# Patient Record
Sex: FEMALE | Race: WHITE | NOT HISPANIC OR LATINO | Employment: OTHER | URBAN - METROPOLITAN AREA
[De-identification: names, ages, dates, MRNs, and addresses within clinical notes are randomized per-mention and may not be internally consistent; named-entity substitution may affect disease eponyms.]

---

## 2021-10-22 ENCOUNTER — OFFICE VISIT (OUTPATIENT)
Dept: URGENT CARE | Facility: CLINIC | Age: 36
End: 2021-10-22
Payer: COMMERCIAL

## 2021-10-22 VITALS
DIASTOLIC BLOOD PRESSURE: 85 MMHG | TEMPERATURE: 98 F | RESPIRATION RATE: 16 BRPM | WEIGHT: 285 LBS | BODY MASS INDEX: 45.8 KG/M2 | SYSTOLIC BLOOD PRESSURE: 141 MMHG | OXYGEN SATURATION: 96 % | HEIGHT: 66 IN | HEART RATE: 76 BPM

## 2021-10-22 DIAGNOSIS — K08.89 PAIN, DENTAL: Primary | ICD-10-CM

## 2021-10-22 PROCEDURE — 99203 OFFICE O/P NEW LOW 30 MIN: CPT | Performed by: EMERGENCY MEDICINE

## 2021-10-22 RX ORDER — CLINDAMYCIN HYDROCHLORIDE 150 MG/1
150 CAPSULE ORAL EVERY 8 HOURS SCHEDULED
Qty: 21 CAPSULE | Refills: 0 | Status: SHIPPED | OUTPATIENT
Start: 2021-10-22 | End: 2021-10-29

## 2022-04-01 ENCOUNTER — OFFICE VISIT (OUTPATIENT)
Dept: URGENT CARE | Facility: CLINIC | Age: 37
End: 2022-04-01
Payer: MEDICARE

## 2022-04-01 VITALS
HEIGHT: 66 IN | RESPIRATION RATE: 18 BRPM | WEIGHT: 280 LBS | BODY MASS INDEX: 45 KG/M2 | HEART RATE: 119 BPM | TEMPERATURE: 98 F | OXYGEN SATURATION: 100 % | SYSTOLIC BLOOD PRESSURE: 137 MMHG | DIASTOLIC BLOOD PRESSURE: 98 MMHG

## 2022-04-01 DIAGNOSIS — Z20.2 STD EXPOSURE: Primary | ICD-10-CM

## 2022-04-01 PROCEDURE — 99213 OFFICE O/P EST LOW 20 MIN: CPT | Performed by: EMERGENCY MEDICINE

## 2022-04-01 PROCEDURE — 87491 CHLMYD TRACH DNA AMP PROBE: CPT | Performed by: EMERGENCY MEDICINE

## 2022-04-01 PROCEDURE — 87591 N.GONORRHOEAE DNA AMP PROB: CPT | Performed by: EMERGENCY MEDICINE

## 2022-04-01 NOTE — PROGRESS NOTES
[facility]        NAME: Lizbeth Mireles is a 29 y o  female  : 1985    MRN: [id number]  DATE: 2022  TIME: 7:04 PM    Assessment and Plan   STD exposure [Z20 2]  1  STD exposure  Chlamydia/GC amplified DNA by PCR    CANCELED: Chlamydia/GC amplified DNA by PCR    CANCELED: Chlamydia/GC amplified DNA by PCR         Patient Instructions     Patient Instructions   Sexually Transmitted Diseases   WHAT YOU NEED TO KNOW:   A sexually transmitted disease (STD) means signs or symptoms of a sexually transmitted infection (STI) have developed  An STI happens when bacteria or a virus are spread through oral, genital, or anal sex  Some examples of STDs are HIV, chlamydia, syphilis, and gonorrhea  DISCHARGE INSTRUCTIONS:   Return to the emergency department if:   · You have genital swelling or pain, or unusual bleeding  · You have joint pain, a rash, swollen lymph nodes, or night sweats  · You have severe abdominal pain  Call your doctor if:   · You have a fever  · Your symptoms do not go away or get worse, even after treatment  · You have bleeding or pain during sex  · You or your female partner may be pregnant  · You have questions or concerns about your condition or care  Medicines: You may need any of the following:  · Antibiotics  may be given for a bacterial infection  · Antivirals  may be given for a viral infection  · Antifungals  may be given for a fungal infection, such as a yeast infection  · Take your medicine as directed  Contact your healthcare provider if you think your medicine is not helping or if you have side effects  Tell him of her if you are allergic to any medicine  Keep a list of the medicines, vitamins, and herbs you take  Include the amounts, and when and why you take them  Bring the list or the pill bottles to follow-up visits  Carry your medicine list with you in case of an emergency      Help prevent the spread of an STI:  Ask your healthcare provider for more information about the following safe sex practices:  · Use a male or female condom during sex  This includes oral, genital, or anal sex  Use a new condom each time  Condoms help prevent pregnancy and STIs  Use latex condoms, if possible  Lambskin (also called sheepskin or natural membrane) condoms do not protect against STIs  A polyurethane condom can be used if you or your partner is allergic to latex  Condoms should be used with a second form of birth control to help prevent pregnancy and STIs  Do not use male and female condoms together  · Do not have sex with someone who has an STI or STD  This includes oral and anal sex  · Limit sex partners  Ask about your partner's sex history before you have sex  · Get screened regularly if you are sexually active  Common tests include chlamydia, gonorrhea, HIV, hepatitis, and syphilis  · Tell your sex partners if you have an STI  Your partners may need to be tested and treated  Do not have sex while you are being treated for an STI  Do not have sex with a partner who is being treated  · Ask about medicines to lower your risk for some STIs:      ? Vaccines  can help protect you from hepatitis A, hepatitis B, and the human papillomavirus (HPV)  The HPV vaccine is usually given at 11 years, but it may be given through 26 years to both females and males  Your provider can give you more information on vaccines to prevent STIs  ? Pre-exposure prophylaxis (PrEP)  may be given if you are at high risk for HIV  PrEP is taken every day to prevent the virus from fully infecting the body  · If you are a woman, do not douche  Douching upsets the normal balance of bacteria found in your vagina  It does not prevent or clear up vaginal infections  Follow up with your doctor as directed: You may need more tests  If you have an STD, you may need immediate or ongoing treatment   Your doctor may also refer you to a specialist who can provide specific treatment  Write down your questions so you remember to ask them during your visits  © Copyright [employer] 2022 Information is for End User's use only and may not be sold, redistributed or otherwise used for commercial purposes  All illustrations and images included in CareNotes® are the copyrighted property of A D A M , Inc  or Joshua Grissom  The above information is an  only  It is not intended as medical advice for individual conditions or treatments  Talk to your doctor, nurse or pharmacist before following any medical regimen to see if it is safe and effective for you  Follow up with PCP in 3-5 days  Proceed to  ER if symptoms worsen  Chief Complaint     Chief Complaint   Patient presents with    Exposure to STD     exposed to chylamdia          History of Present Illness       Patient concerned about possibility of chlamydia after friend of a friend tested positive  Patient denies any symptoms  Review of Systems   Review of Systems   Constitutional: Negative for chills and fever  Respiratory: Negative for shortness of breath  Cardiovascular: Negative for chest pain  Gastrointestinal: Negative for abdominal distention, abdominal pain, blood in stool, nausea and vomiting  Genitourinary: Negative for menstrual problem, pelvic pain, vaginal bleeding and vaginal discharge  Skin: Negative for pallor and rash  Current Medications     No current outpatient medications on file      Current Allergies     Allergies as of 04/01/2022 - Reviewed 04/01/2022   Allergen Reaction Noted    Penicillins Hives 05/29/2018    Cephalosporins Hives 04/01/2022            The following portions of the patient's history were reviewed and updated as appropriate: allergies, current medications, past family history, past medical history, past social history, past surgical history and problem list      Past Medical History:   Diagnosis Date    Known health problems: none        Past Surgical History:   Procedure Laterality Date    ADENOIDECTOMY      TONSILLECTOMY      TYMPANOSTOMY TUBE PLACEMENT         History reviewed  No pertinent family history  Medications have been verified  Objective   /98   Pulse (!) 119   Temp 98 °F (36 7 °C)   Resp 18   Ht 5' 6" (1 676 m)   Wt 127 kg (280 lb)   SpO2 100%   BMI 45 19 kg/m²        Physical Exam     Physical Exam  Vitals and nursing note reviewed  Constitutional:       General: She is not in acute distress  Appearance: Normal appearance  She is obese  She is not ill-appearing  Skin:     General: Skin is warm and dry  Findings: No erythema  Neurological:      Mental Status: She is alert  Psychiatric:         Mood and Affect: Mood normal          Behavior: Behavior normal          Thought Content:  Thought content normal          Judgment: Judgment normal

## 2022-04-01 NOTE — PATIENT INSTRUCTIONS
Sexually Transmitted Diseases   WHAT YOU NEED TO KNOW:   A sexually transmitted disease (STD) means signs or symptoms of a sexually transmitted infection (STI) have developed  An STI happens when bacteria or a virus are spread through oral, genital, or anal sex  Some examples of STDs are HIV, chlamydia, syphilis, and gonorrhea  DISCHARGE INSTRUCTIONS:   Return to the emergency department if:   · You have genital swelling or pain, or unusual bleeding  · You have joint pain, a rash, swollen lymph nodes, or night sweats  · You have severe abdominal pain  Call your doctor if:   · You have a fever  · Your symptoms do not go away or get worse, even after treatment  · You have bleeding or pain during sex  · You or your female partner may be pregnant  · You have questions or concerns about your condition or care  Medicines: You may need any of the following:  · Antibiotics  may be given for a bacterial infection  · Antivirals  may be given for a viral infection  · Antifungals  may be given for a fungal infection, such as a yeast infection  · Take your medicine as directed  Contact your healthcare provider if you think your medicine is not helping or if you have side effects  Tell him of her if you are allergic to any medicine  Keep a list of the medicines, vitamins, and herbs you take  Include the amounts, and when and why you take them  Bring the list or the pill bottles to follow-up visits  Carry your medicine list with you in case of an emergency  Help prevent the spread of an STI:  Ask your healthcare provider for more information about the following safe sex practices:  · Use a male or female condom during sex  This includes oral, genital, or anal sex  Use a new condom each time  Condoms help prevent pregnancy and STIs  Use latex condoms, if possible  Lambskin (also called sheepskin or natural membrane) condoms do not protect against STIs   A polyurethane condom can be used if you or your partner is allergic to latex  Condoms should be used with a second form of birth control to help prevent pregnancy and STIs  Do not use male and female condoms together  · Do not have sex with someone who has an STI or STD  This includes oral and anal sex  · Limit sex partners  Ask about your partner's sex history before you have sex  · Get screened regularly if you are sexually active  Common tests include chlamydia, gonorrhea, HIV, hepatitis, and syphilis  · Tell your sex partners if you have an STI  Your partners may need to be tested and treated  Do not have sex while you are being treated for an STI  Do not have sex with a partner who is being treated  · Ask about medicines to lower your risk for some STIs:      ? Vaccines  can help protect you from hepatitis A, hepatitis B, and the human papillomavirus (HPV)  The HPV vaccine is usually given at 11 years, but it may be given through 26 years to both females and males  Your provider can give you more information on vaccines to prevent STIs  ? Pre-exposure prophylaxis (PrEP)  may be given if you are at high risk for HIV  PrEP is taken every day to prevent the virus from fully infecting the body  · If you are a woman, do not douche  Douching upsets the normal balance of bacteria found in your vagina  It does not prevent or clear up vaginal infections  Follow up with your doctor as directed: You may need more tests  If you have an STD, you may need immediate or ongoing treatment  Your doctor may also refer you to a specialist who can provide specific treatment  Write down your questions so you remember to ask them during your visits  © Copyright [employer] 2022 Information is for End User's use only and may not be sold, redistributed or otherwise used for commercial purposes   All illustrations and images included in CareNotes® are the copyrighted property of [employer] Inc  or [facility] Manjit Nieto   The above information is an  only  It is not intended as medical advice for individual conditions or treatments  Talk to your doctor, nurse or pharmacist before following any medical regimen to see if it is safe and effective for you

## 2022-04-02 LAB
C TRACH DNA SPEC QL NAA+PROBE: NEGATIVE
N GONORRHOEA DNA SPEC QL NAA+PROBE: NEGATIVE

## 2022-06-02 ENCOUNTER — OFFICE VISIT (OUTPATIENT)
Dept: URGENT CARE | Facility: CLINIC | Age: 37
End: 2022-06-02
Payer: MEDICARE

## 2022-06-02 VITALS
WEIGHT: 280 LBS | RESPIRATION RATE: 16 BRPM | OXYGEN SATURATION: 98 % | TEMPERATURE: 97 F | HEIGHT: 66 IN | SYSTOLIC BLOOD PRESSURE: 127 MMHG | HEART RATE: 100 BPM | DIASTOLIC BLOOD PRESSURE: 56 MMHG | BODY MASS INDEX: 45 KG/M2

## 2022-06-02 DIAGNOSIS — L23.7 ALLERGIC DERMATITIS DUE TO POISON IVY: Primary | ICD-10-CM

## 2022-06-02 DIAGNOSIS — R55 VASOVAGAL SYNCOPE: ICD-10-CM

## 2022-06-02 PROCEDURE — 96372 THER/PROPH/DIAG INJ SC/IM: CPT | Performed by: PHYSICIAN ASSISTANT

## 2022-06-02 PROCEDURE — 99213 OFFICE O/P EST LOW 20 MIN: CPT | Performed by: PHYSICIAN ASSISTANT

## 2022-06-02 RX ORDER — METHYLPREDNISOLONE SODIUM SUCCINATE 125 MG/2ML
125 INJECTION, POWDER, LYOPHILIZED, FOR SOLUTION INTRAMUSCULAR; INTRAVENOUS ONCE
Status: DISCONTINUED | OUTPATIENT
Start: 2022-06-02 | End: 2022-06-02

## 2022-06-02 RX ORDER — METHYLPREDNISOLONE SODIUM SUCCINATE 125 MG/2ML
125 INJECTION, POWDER, LYOPHILIZED, FOR SOLUTION INTRAMUSCULAR; INTRAVENOUS ONCE
Status: COMPLETED | OUTPATIENT
Start: 2022-06-02 | End: 2022-06-02

## 2022-06-02 RX ADMIN — METHYLPREDNISOLONE SODIUM SUCCINATE 125 MG: 125 INJECTION, POWDER, LYOPHILIZED, FOR SOLUTION INTRAMUSCULAR; INTRAVENOUS at 20:05

## 2022-06-02 NOTE — PROGRESS NOTES
Approx  3 minutes after giving IM injection I heard a loud bang in room  I ran in and found pt laying on left side on the ground  Used an ammonia inhalant and pt woke up  Took pt approx 30 seconds to respond  Alert and oriented  Small contusion in left frontal area  PEARLA  Vitals stable  Very diaphoretic  Given OJ and crackers  Small abrasion on right lateral ankle

## 2022-06-02 NOTE — PROGRESS NOTES
[facility]        NAME: Hanna Homans is a 29 y o  female  : 1985    MRN: [id number]  DATE: 2022  TIME: 8:09 PM    Assessment and Plan   Allergic dermatitis due to poison ivy [L23 7]  1  Allergic dermatitis due to poison ivy  methylPREDNISolone sodium succinate (Solu-MEDROL) injection 125 mg    DISCONTINUED: methylPREDNISolone sodium succinate (Solu-MEDROL) injection 125 mg   2  Vasovagal syncope       Patient had vasovagal syncope after steroid injection  Patient fell off the table and struck her left temple on computer in sustained small superficial abrasion to right ankle  Patient with LOC for approximately for 20 seconds  Ammonia used  Patient became alert and oriented  Vitals normal  Neuro exam normal   Small contusion to forehead  No neck pain  Discussed with patient signs and symptoms of concussion and to seek evaluation emergency room if they occur  Given OJ and crackers  Patient Instructions    May use over-the-counter Benadryl in addition to prednisone if needed  Avoid scratching  Cool showers  Ice packs  Observe for signs of infection including redness, cloudy drainage, swelling, streaking up the extremity, fevers and chills, or persistent symptoms  Follow-up with PCP in 3-5 days  Go to ER if symptoms become severe  Follow up with PCP in 3-5 days  Proceed to  ER if symptoms worsen  Chief Complaint     Chief Complaint   Patient presents with   [facility]     Poison ivy on buttock x1 week- gradually getting worse         History of Present Illness       Patient is a 71-year-old female with significant past medical history of suppurative hidradenitis presents the office complaining of poison ivy to her buttocks and posterior thighs for 1 week  Reports the rash is intensely pruritic  States she was hanging out in the woods and her friends who were with her also have poison ivy    She has been using over-the-counter poison ivy lotion and oatmeal bath with no improvement of symptoms  Denies fevers, chills, or purulent drainage  Review of Systems   Review of Systems   Constitutional: Negative for chills and fever  HENT: Negative for trouble swallowing  Respiratory: Negative for cough and chest tightness  Skin: Positive for rash  Current Medications     No current outpatient medications on file  No current facility-administered medications for this visit  Current Allergies     Allergies as of 06/02/2022 - Reviewed 06/02/2022   Allergen Reaction Noted    Penicillins Hives 05/29/2018    Cephalosporins Hives 04/01/2022            The following portions of the patient's history were reviewed and updated as appropriate: allergies, current medications, past family history, past medical history, past social history, past surgical history and problem list      Past Medical History:   Diagnosis Date    Known health problems: none        Past Surgical History:   Procedure Laterality Date    ADENOIDECTOMY      TONSILLECTOMY      TYMPANOSTOMY TUBE PLACEMENT         Family History   Problem Relation Age of Onset    No Known Problems Mother     Pulmonary fibrosis Father          Medications have been verified  Objective   /56   Pulse 100   Temp (!) 97 °F (36 1 °C)   Resp 16   Ht 5' 6" (1 676 m)   Wt 127 kg (280 lb)   LMP 06/01/2022   SpO2 98%   BMI 45 19 kg/m²   Patient's last menstrual period was 06/01/2022  Physical Exam     Physical Exam  Vitals and nursing note reviewed  Constitutional:       Appearance: She is well-developed  HENT:      Head: Normocephalic and atraumatic  Right Ear: External ear normal       Left Ear: External ear normal       Nose: Nose normal    Eyes:      General: Lids are normal       Conjunctiva/sclera: Conjunctivae normal    Cardiovascular:      Rate and Rhythm: Normal rate and regular rhythm     Pulmonary:      Effort: Pulmonary effort is normal       Breath sounds: Normal breath sounds  Skin:     General: Skin is warm and dry  Capillary Refill: Capillary refill takes less than 2 seconds  Findings: Rash (Macular erythematous rash to buttocks and posterior thighs ) and wound (small abraion to lateral right ankle  Cleaned and placed bandaid ) present  Neurological:      General: No focal deficit present  Mental Status: She is alert  GCS: GCS eye subscore is 4  GCS verbal subscore is 5  GCS motor subscore is 6  Cranial Nerves: Cranial nerves are intact  Sensory: Sensation is intact  Motor: Motor function is intact  Coordination: Coordination is intact  Gait: Gait is intact

## 2022-06-02 NOTE — PATIENT INSTRUCTIONS
May use over-the-counter Benadryl in addition to prednisone if needed  Avoid scratching  Cool showers  Ice packs  Observe for signs of infection including redness, cloudy drainage, swelling, streaking up the extremity, fevers and chills, or persistent symptoms  Follow-up with PCP in 3-5 days  Go to ER if symptoms become severe

## 2022-06-03 ENCOUNTER — TELEPHONE (OUTPATIENT)
Dept: URGENT CARE | Facility: CLINIC | Age: 37
End: 2022-06-03

## 2022-06-03 NOTE — TELEPHONE ENCOUNTER
Patient called to ask about Prescription not being at Pharmacy for pickup today 6/3  Patient informed that due to the reaction patient had yesterday (on 6/2) to the IM injection given  Our provider did not want to continue with the oral medication due to the possible side effects patient could have  Patient informed to continue with taking the OTC medication Benadryl, to take cool showers, use ice packs, and to avoid itching  Patient also informed that if redness, cloudy drainage, or streaking is to occur to head to the nearest ER for evaluation  Patient understanding to all education given    Timothy Guillen RN

## 2022-11-02 ENCOUNTER — OFFICE VISIT (OUTPATIENT)
Dept: URGENT CARE | Facility: CLINIC | Age: 37
End: 2022-11-02

## 2022-11-02 VITALS
WEIGHT: 285 LBS | BODY MASS INDEX: 45.8 KG/M2 | HEIGHT: 66 IN | RESPIRATION RATE: 18 BRPM | DIASTOLIC BLOOD PRESSURE: 76 MMHG | TEMPERATURE: 97.1 F | OXYGEN SATURATION: 95 % | HEART RATE: 79 BPM | SYSTOLIC BLOOD PRESSURE: 104 MMHG

## 2022-11-02 DIAGNOSIS — J06.9 ACUTE URI: ICD-10-CM

## 2022-11-02 DIAGNOSIS — H66.001 NON-RECURRENT ACUTE SUPPURATIVE OTITIS MEDIA OF RIGHT EAR WITHOUT SPONTANEOUS RUPTURE OF TYMPANIC MEMBRANE: Primary | ICD-10-CM

## 2022-11-02 RX ORDER — AZITHROMYCIN 500 MG/1
TABLET, FILM COATED ORAL
Qty: 6 TABLET | Refills: 0 | Status: SHIPPED | OUTPATIENT
Start: 2022-11-02 | End: 2022-11-06

## 2022-11-02 RX ORDER — IBUPROFEN 800 MG/1
800 TABLET ORAL DAILY PRN
COMMUNITY

## 2022-11-02 NOTE — ASSESSMENT & PLAN NOTE
Discussed problem patient  Advised over-the-counter treatments for URI symptoms such as DayQuil and NyQuil  Advised to try warm salt water gargle for sore throat and decongestants such as Mucinex for congestion and postnasal drip

## 2022-11-02 NOTE — PATIENT INSTRUCTIONS
Discussed chronic patient  Prescribing azithromycin for right ear infection  You may want to take antibiotic with some food as it causes some GI upset  Discussed over-the-counter treatments for cold symptoms such as DayQuil and NyQuil for and ibuprofen as needed for any pain  Recommended a humidifier in the bedroom at night to help with congestion as well as a warm salt water gargle for sore throat  Advised to follow-up with PCP in 5-7 days for recheck  Answered all questions

## 2022-11-02 NOTE — ASSESSMENT & PLAN NOTE
Discussed problem with patient  Placing patient on azithromycin for right-sided acute otitis media due to penicillin and cephalosporin allergy  Advised patient to follow-up with PCP in 5 days for recheck

## 2022-11-02 NOTE — PROGRESS NOTES
[facility]        NAME: Akira Calzada is a 40 y o  female  : 1985    MRN: [id number]  DATE: 2022  TIME: 11:59 AM    Assessment and Plan   Non-recurrent acute suppurative otitis media of right ear without spontaneous rupture of tympanic membrane [H66 001]  1  Non-recurrent acute suppurative otitis media of right ear without spontaneous rupture of tympanic membrane  azithromycin (ZITHROMAX) 500 MG tablet   2  Acute URI        Discussed problem with patient  Placing patient on azithromycin for right-sided acute otitis media due to penicillin and cephalosporin allergy  Advised over-the-counter ibuprofen for pain symptoms  Advised patient to follow-up with PCP in 5 days for recheck  Discussed problem patient  Advised over-the-counter treatments for URI symptoms such as DayQuil and NyQuil  Advised to try warm salt water gargle for sore throat and decongestants such as Mucinex for congestion and postnasal drip  Patient Instructions       Follow up with PCP in 3-5 days  Proceed to  ER if symptoms worsen  Chief Complaint     Chief Complaint   Patient presents with   • Cold Like Symptoms     C/o cough, sore throat, bilateral ear pain, onset 3 days ago  Refuses Covid testing  History of Present Illness       A 28-year-old female complains of nasal congestion, postnasal drip, and bilateral ear pain for the last 2 days  Her daughter was recently sick with upper respiratory symptoms last week and has since improved  Has been eating and drinking normally  Has not tried any over-the-counter treatments for relief  Denies fevers, chills, abdominal complaints, and myalgias      Review of Systems   Review of Systems   Constitutional: Negative for appetite change, chills and fever  HENT: Positive for congestion, ear pain, postnasal drip and sore throat  Negative for ear discharge, sinus pressure and sinus pain  Respiratory: Positive for cough   Negative for shortness of breath, wheezing and stridor  Cardiovascular: Negative for chest pain and palpitations  Gastrointestinal: Negative for abdominal pain, constipation, diarrhea, nausea and vomiting  Musculoskeletal: Negative for myalgias  Skin: Negative for pallor and rash  Neurological: Negative for light-headedness and headaches  Current Medications       Current Outpatient Medications:   •  azithromycin (ZITHROMAX) 500 MG tablet, Take 2 tablets today then 1 tablet daily x 4 days, Disp: 6 tablet, Rfl: 0  •  ibuprofen (MOTRIN) 800 mg tablet, Take 800 mg by mouth daily as needed for mild pain, Disp: , Rfl:     Current Allergies     Allergies as of 11/02/2022 - Reviewed 11/02/2022   Allergen Reaction Noted   • Penicillins Hives 05/29/2018   • Cephalosporins Hives 04/01/2022   • Dorenda Odalis [loracarbef] Hives 11/02/2022            The following portions of the patient's history were reviewed and updated as appropriate: allergies, current medications, past family history, past medical history, past social history, past surgical history and problem list      Past Medical History:   Diagnosis Date   • Known health problems: none        Past Surgical History:   Procedure Laterality Date   • ADENOIDECTOMY     • TONSILLECTOMY     • TYMPANOSTOMY TUBE PLACEMENT         Family History   Problem Relation Age of Onset   • No Known Problems Mother    • Pulmonary fibrosis Father          Medications have been verified  Objective   /76   Pulse 79   Temp (!) 97 1 °F (36 2 °C)   Resp 18   Ht 5' 6" (1 676 m)   Wt 129 kg (285 lb)   LMP 10/07/2022   SpO2 95%   BMI 46 00 kg/m²        Physical Exam     Physical Exam  Vitals reviewed  Constitutional:       General: She is not in acute distress  Appearance: Normal appearance  She is normal weight  She is not ill-appearing  HENT:      Head: Normocephalic  Right Ear: Hearing, ear canal and external ear normal  Tympanic membrane is erythematous and bulging  Left Ear: Hearing, tympanic membrane, ear canal and external ear normal       Nose: Congestion and rhinorrhea present  Mouth/Throat:      Mouth: Mucous membranes are moist       Pharynx: Oropharynx is clear  No oropharyngeal exudate or posterior oropharyngeal erythema  Eyes:      General:         Right eye: No discharge  Left eye: No discharge  Extraocular Movements: Extraocular movements intact  Conjunctiva/sclera: Conjunctivae normal       Pupils: Pupils are equal, round, and reactive to light  Neck:      Vascular: No carotid bruit  Cardiovascular:      Rate and Rhythm: Normal rate and regular rhythm  Pulses: Normal pulses  Heart sounds: Normal heart sounds  No murmur heard  No friction rub  No gallop  Pulmonary:      Effort: Pulmonary effort is normal  No respiratory distress  Breath sounds: Normal breath sounds  No stridor  No wheezing, rhonchi or rales  Chest:      Chest wall: No tenderness  Abdominal:      General: Abdomen is flat  Bowel sounds are normal  There is no distension  Palpations: Abdomen is soft  There is no mass  Tenderness: There is no abdominal tenderness  There is no guarding or rebound  Hernia: No hernia is present  Musculoskeletal:      Cervical back: Normal range of motion and neck supple  No rigidity or tenderness  Lymphadenopathy:      Cervical: No cervical adenopathy  Skin:     General: Skin is warm and dry  Capillary Refill: Capillary refill takes less than 2 seconds  Coloration: Skin is not pale  Findings: No bruising or erythema  Neurological:      General: No focal deficit present  Mental Status: She is alert and oriented to person, place, and time  Cranial Nerves: No cranial nerve deficit  Sensory: No sensory deficit  Motor: No weakness        Coordination: Coordination normal    Psychiatric:         Mood and Affect: Mood normal          Behavior: Behavior normal

## 2023-01-01 PROBLEM — H66.001 NON-RECURRENT ACUTE SUPPURATIVE OTITIS MEDIA OF RIGHT EAR WITHOUT SPONTANEOUS RUPTURE OF TYMPANIC MEMBRANE: Status: RESOLVED | Noted: 2022-11-02 | Resolved: 2023-01-01

## 2023-01-01 PROBLEM — J06.9 ACUTE URI: Status: RESOLVED | Noted: 2022-11-02 | Resolved: 2023-01-01

## 2023-03-01 ENCOUNTER — HOSPITAL ENCOUNTER (EMERGENCY)
Facility: HOSPITAL | Age: 38
Discharge: HOME | End: 2023-03-01
Attending: EMERGENCY MEDICINE

## 2023-03-01 ENCOUNTER — APPOINTMENT (EMERGENCY)
Dept: ULTRASOUND IMAGING | Facility: HOSPITAL | Age: 38
End: 2023-03-01

## 2023-03-01 VITALS
DIASTOLIC BLOOD PRESSURE: 90 MMHG | OXYGEN SATURATION: 99 % | SYSTOLIC BLOOD PRESSURE: 140 MMHG | BODY MASS INDEX: 45.55 KG/M2 | TEMPERATURE: 97.7 F | RESPIRATION RATE: 16 BRPM | HEART RATE: 98 BPM | WEIGHT: 282.19 LBS

## 2023-03-01 DIAGNOSIS — N83.209 HEMORRHAGIC OVARIAN CYST: ICD-10-CM

## 2023-03-01 DIAGNOSIS — N93.8 DYSFUNCTIONAL UTERINE BLEEDING: Primary | ICD-10-CM

## 2023-03-01 LAB
ABO GROUP BLD: NORMAL
ALBUMIN SERPL BCP-MCNC: 4.2 G/DL (ref 3.5–5)
ALP SERPL-CCNC: 73 U/L (ref 34–104)
ALT SERPL W P-5'-P-CCNC: 10 U/L (ref 7–52)
ANION GAP SERPL CALCULATED.3IONS-SCNC: 6 MMOL/L (ref 4–13)
AST SERPL W P-5'-P-CCNC: 15 U/L (ref 13–39)
BASOPHILS # BLD AUTO: 0.07 THOUSANDS/ÂΜL (ref 0–0.1)
BASOPHILS NFR BLD AUTO: 1 % (ref 0–1)
BILIRUB SERPL-MCNC: 0.28 MG/DL (ref 0.2–1)
BLD GP AB SCN SERPL QL: NEGATIVE
BUN SERPL-MCNC: 12 MG/DL (ref 5–25)
CALCIUM SERPL-MCNC: 9.3 MG/DL (ref 8.4–10.2)
CHLORIDE SERPL-SCNC: 100 MMOL/L (ref 96–108)
CO2 SERPL-SCNC: 32 MMOL/L (ref 21–32)
CREAT SERPL-MCNC: 0.65 MG/DL (ref 0.6–1.3)
EOSINOPHIL # BLD AUTO: 0.39 THOUSAND/ÂΜL (ref 0–0.61)
EOSINOPHIL NFR BLD AUTO: 3 % (ref 0–6)
ERYTHROCYTE [DISTWIDTH] IN BLOOD BY AUTOMATED COUNT: 16.7 % (ref 11.6–15.1)
EXT PREGNANCY TEST URINE: NEGATIVE
EXT. CONTROL: NORMAL
GFR SERPL CREATININE-BSD FRML MDRD: 114 ML/MIN/1.73SQ M
GLUCOSE SERPL-MCNC: 105 MG/DL (ref 65–140)
HCT VFR BLD AUTO: 41 % (ref 34.8–46.1)
HGB BLD-MCNC: 12.4 G/DL (ref 11.5–15.4)
IMM GRANULOCYTES # BLD AUTO: 0.06 THOUSAND/UL (ref 0–0.2)
IMM GRANULOCYTES NFR BLD AUTO: 1 % (ref 0–2)
LYMPHOCYTES # BLD AUTO: 2.42 THOUSANDS/ÂΜL (ref 0.6–4.47)
LYMPHOCYTES NFR BLD AUTO: 21 % (ref 14–44)
MCH RBC QN AUTO: 24.9 PG (ref 26.8–34.3)
MCHC RBC AUTO-ENTMCNC: 30.2 G/DL (ref 31.4–37.4)
MCV RBC AUTO: 83 FL (ref 82–98)
MONOCYTES # BLD AUTO: 0.34 THOUSAND/ÂΜL (ref 0.17–1.22)
MONOCYTES NFR BLD AUTO: 3 % (ref 4–12)
NEUTROPHILS # BLD AUTO: 8.5 THOUSANDS/ÂΜL (ref 1.85–7.62)
NEUTS SEG NFR BLD AUTO: 71 % (ref 43–75)
NRBC BLD AUTO-RTO: 0 /100 WBCS
PLATELET # BLD AUTO: 346 THOUSANDS/UL (ref 149–390)
PMV BLD AUTO: 10.4 FL (ref 8.9–12.7)
POTASSIUM SERPL-SCNC: 4 MMOL/L (ref 3.5–5.3)
PROT SERPL-MCNC: 7.6 G/DL (ref 6.4–8.4)
RBC # BLD AUTO: 4.97 MILLION/UL (ref 3.81–5.12)
RH BLD: POSITIVE
SODIUM SERPL-SCNC: 138 MMOL/L (ref 135–147)
SPECIMEN EXPIRATION DATE: NORMAL
WBC # BLD AUTO: 11.78 THOUSAND/UL (ref 4.31–10.16)

## 2023-03-01 NOTE — ED PROVIDER NOTES
History  Chief Complaint   Patient presents with   • Vaginal Bleeding     Vaginal bleeding for about a month and is anemic and past 5 days she has been heavy with clots  Seen by PCP and they told her to come in to the ED  Patient complains of 1 month of heavy daily vaginal bleeding  She states she called her primary care physician but was told to come to the emergency room due to prior history of "anemia"  Review of patient's lab work shows that her hemoglobin was 11 5 in early January  Otherwise, patient denies chest pain or shortness of breath  She denies abdominal pain  She denies syncope or presyncope  She does complain of generalized weakness  Patient states she had a similar episode 5 years ago for which she required D&C      History provided by:  Patient   used: No    Vaginal Bleeding  Quality:  Dark red and clots  Severity:  Moderate  Onset quality:  Gradual  Duration:  1 month  Timing:  Constant  Progression:  Unchanged  Chronicity:  New  Relieved by:  Nothing  Worsened by:  Nothing  Ineffective treatments:  None tried  Associated symptoms: fatigue    Associated symptoms: no abdominal pain, no dizziness, no dyspareunia, no dysuria, no fever, no nausea and no vaginal discharge        Prior to Admission Medications   Prescriptions Last Dose Informant Patient Reported? Taking?   ibuprofen (MOTRIN) 800 mg tablet   Yes No   Sig: Take 800 mg by mouth daily as needed for mild pain      Facility-Administered Medications: None       Past Medical History:   Diagnosis Date   • Known health problems: none        Past Surgical History:   Procedure Laterality Date   • ADENOIDECTOMY     • TONSILLECTOMY     • TYMPANOSTOMY TUBE PLACEMENT         Family History   Problem Relation Age of Onset   • No Known Problems Mother    • Pulmonary fibrosis Father      I have reviewed and agree with the history as documented      E-Cigarette/Vaping   • E-Cigarette Use Never User      E-Cigarette/Vaping Substances     Social History     Tobacco Use   • Smoking status: Every Day     Packs/day: 0 50     Types: Cigarettes     Last attempt to quit: 10/1/2021     Years since quittin 4   • Smokeless tobacco: Never   Vaping Use   • Vaping Use: Never used   Substance Use Topics   • Drug use: Never       Review of Systems   Constitutional: Positive for fatigue  Negative for chills and fever  HENT: Negative for ear pain, hearing loss, sore throat, trouble swallowing and voice change  Eyes: Negative for pain and discharge  Respiratory: Negative for cough, shortness of breath and wheezing  Cardiovascular: Negative for chest pain and palpitations  Gastrointestinal: Negative for abdominal pain, blood in stool, constipation, diarrhea, nausea and vomiting  Genitourinary: Positive for vaginal bleeding  Negative for dyspareunia, dysuria, flank pain, frequency, hematuria and vaginal discharge  Musculoskeletal: Negative for joint swelling, neck pain and neck stiffness  Skin: Negative for rash and wound  Neurological: Negative for dizziness, seizures, syncope, facial asymmetry and headaches  Psychiatric/Behavioral: Negative for hallucinations, self-injury and suicidal ideas  All other systems reviewed and are negative  Physical Exam  Physical Exam  Vitals and nursing note reviewed  Constitutional:       General: She is not in acute distress  Appearance: She is well-developed  HENT:      Head: Normocephalic and atraumatic  Right Ear: External ear normal       Left Ear: External ear normal    Eyes:      General: No scleral icterus  Right eye: No discharge  Left eye: No discharge  Extraocular Movements: Extraocular movements intact  Conjunctiva/sclera: Conjunctivae normal    Cardiovascular:      Rate and Rhythm: Normal rate and regular rhythm  Heart sounds: Normal heart sounds  No murmur heard    Pulmonary:      Effort: Pulmonary effort is normal       Breath sounds: Normal breath sounds  No wheezing or rales  Abdominal:      General: Bowel sounds are normal  There is no distension  Palpations: Abdomen is soft  Tenderness: There is no abdominal tenderness  There is no guarding or rebound  Musculoskeletal:         General: No deformity  Normal range of motion  Cervical back: Normal range of motion and neck supple  Skin:     General: Skin is warm and dry  Findings: No rash  Neurological:      General: No focal deficit present  Mental Status: She is alert and oriented to person, place, and time  Cranial Nerves: No cranial nerve deficit  Psychiatric:         Mood and Affect: Mood normal          Behavior: Behavior normal          Thought Content:  Thought content normal          Judgment: Judgment normal          Vital Signs  ED Triage Vitals [03/01/23 1513]   Temperature Pulse Respirations Blood Pressure SpO2   97 7 °F (36 5 °C) 98 16 140/90 99 %      Temp Source Heart Rate Source Patient Position - Orthostatic VS BP Location FiO2 (%)   Temporal Monitor Sitting Left arm --      Pain Score       No Pain           Vitals:    03/01/23 1513   BP: 140/90   Pulse: 98   Patient Position - Orthostatic VS: Sitting         Visual Acuity      ED Medications  Medications - No data to display    Diagnostic Studies  Results Reviewed     Procedure Component Value Units Date/Time    POCT pregnancy, urine [[id number]]  (Normal) Resulted: 03/01/23 1700    Lab Status: Final result Updated: 03/01/23 1715     EXT Preg Test, Ur Negative     Control Valid    Comprehensive metabolic panel [[id number]] Collected: 03/01/23 1557    Lab Status: Final result Specimen: Blood from Arm, Left Updated: 03/01/23 1616     Sodium 138 mmol/L      Potassium 4 0 mmol/L      Chloride 100 mmol/L      CO2 32 mmol/L      ANION GAP 6 mmol/L      BUN 12 mg/dL      Creatinine 0 65 mg/dL      Glucose 105 mg/dL      Calcium 9 3 mg/dL      AST 15 U/L      ALT 10 U/L      Alkaline Phosphatase 73 U/L      Total Protein 7 6 g/dL      Albumin 4 2 g/dL      Total Bilirubin 0 28 mg/dL      eGFR 114 ml/min/1 73sq m     Narrative:      [place] guidelines for Chronic Kidney Disease (CKD):   •  Stage 1 with normal or high GFR (GFR > 90 mL/min/1 73 square meters)  •  Stage 2 Mild CKD (GFR = 60-89 mL/min/1 73 square meters)  •  Stage 3A Moderate CKD (GFR = 45-59 mL/min/1 73 square meters)  •  Stage 3B Moderate CKD (GFR = 30-44 mL/min/1 73 square meters)  •  Stage 4 Severe CKD (GFR = 15-29 mL/min/1 73 square meters)  •  Stage 5 End Stage CKD (GFR <15 mL/min/1 73 square meters)  Note: GFR calculation is accurate only with a steady state creatinine    CBC and differential [[id number]]  (Abnormal) Collected: 03/01/23 1557    Lab Status: Final result Specimen: Blood from Arm, Left Updated: 03/01/23 1602     WBC 11 78 Thousand/uL      RBC 4 97 Million/uL      Hemoglobin 12 4 g/dL      Hematocrit 41 0 %      MCV 83 fL      MCH 24 9 pg      MCHC 30 2 g/dL      RDW 16 7 %      MPV 10 4 fL      Platelets 732 Thousands/uL      nRBC 0 /100 WBCs      Neutrophils Relative 71 %      Immat GRANS % 1 %      Lymphocytes Relative 21 %      Monocytes Relative 3 %      Eosinophils Relative 3 %      Basophils Relative 1 %      Neutrophils Absolute 8 50 Thousands/µL      Immature Grans Absolute 0 06 Thousand/uL      Lymphocytes Absolute 2 42 Thousands/µL      Monocytes Absolute 0 34 Thousand/µL      Eosinophils Absolute 0 39 Thousand/µL      Basophils Absolute 0 07 Thousands/µL                  US pelvis complete w transvaginal   Final Result by Smita Higuera MD (03/01 1831)      Endometrial thickness at 10 mm  Simple left adnexal cyst and complex cyst in the right adnexa        Based on the ACR O-RADS system, this is a typical hemorrhagic cyst, O-RADS category 2 (almost certain benign with <9% risk of malignancy ) The management recommendation is no additional workup or followup needed  REFERENCE: Radiology 2020; [id number]                     Workstation performed: [id number]                    Procedures  Procedures         ED Course  ED Course as of 03/01/23 1848   Wed Mar 01, 2023   1714 Urine pregnancy negative  Hemoglobin within normal limits  Will await ultrasound result  Medical Decision Making  Patient presents with 1 month of abnormal uterine bleeding  No significant abdominal pain  Similar episode 5 years ago  Hemodynamically stable in the ER  Differential diagnosis includes but is not limited to ectopic pregnancy, ovarian cyst, uterine fibroids, uterine mass, dysfunctional uterine bleeding  Work-up in the emergency department is largely negative with the ultrasound revealing only ovarian cyst   Patient stable for discharge and advised to follow-up with OB/GYN  She is agreeable  No indication at this time for new prescription medications  Dysfunctional uterine bleeding: acute illness or injury  Hemorrhagic ovarian cyst: acute illness or injury  Amount and/or Complexity of Data Reviewed  External Data Reviewed: labs, radiology and notes  Labs: ordered  Decision-making details documented in ED Course  Details: Within normal limits, no anemia  Radiology: ordered and independent interpretation performed  Decision-making details documented in ED Course  Details: Ultrasound reveals right-sided hemorrhagic cyst and left-sided simple cyst      Risk  Prescription drug management            Disposition  Final diagnoses:   Dysfunctional uterine bleeding   Hemorrhagic ovarian cyst     Time reflects when diagnosis was documented in both MDM as applicable and the Disposition within this note     Time User Action Codes Description Comment    3/1/2023  6:43 PM Farrukh Martin Add [N93 8] Dysfunctional uterine bleeding     3/1/2023  6:44 PM Farrukh Martin Add [N83 [id number]] Hemorrhagic ovarian cyst       ED Disposition     ED Disposition   Discharge    Condition   Stable    Date/Time   Wed Mar 1, 2023  6:43 PM    Comment   Romina Pat discharge to home/self care  Follow-up Information     Follow up With Specialties Details Why Contact Info    Rebecca White MD Family Medicine  As needed [address] [place] [address] [address] [phone]      Your OB/GYN  In 2 days            Patient's Medications   Discharge Prescriptions    No medications on file       No discharge procedures on file      PDMP Review     None          ED Provider  Electronically Signed by           Lesa Nieto MD  03/01/23 1828

## 2023-03-01 NOTE — DISCHARGE INSTRUCTIONS
The work-up performed today in the emergency department revealed normal blood work  There was no significant anemia  The ultrasound performed today revealed a right-sided hemorrhagic ovarian cyst and a simple left-sided ovarian cyst  Please schedule a follow-up appointment with your OB/GYN in the near future for further evaluation of these findings

## 2023-09-13 ENCOUNTER — OFFICE VISIT (OUTPATIENT)
Dept: URGENT CARE | Facility: CLINIC | Age: 38
End: 2023-09-13
Payer: COMMERCIAL

## 2023-09-13 VITALS
TEMPERATURE: 98 F | WEIGHT: 280 LBS | RESPIRATION RATE: 20 BRPM | HEART RATE: 95 BPM | BODY MASS INDEX: 45 KG/M2 | DIASTOLIC BLOOD PRESSURE: 67 MMHG | OXYGEN SATURATION: 96 % | HEIGHT: 66 IN | SYSTOLIC BLOOD PRESSURE: 141 MMHG

## 2023-09-13 DIAGNOSIS — Z20.2 STD EXPOSURE: Primary | ICD-10-CM

## 2023-09-13 LAB
SL AMB  POCT GLUCOSE, UA: NEGATIVE
SL AMB LEUKOCYTE ESTERASE,UA: NEGATIVE
SL AMB POCT BILIRUBIN,UA: NEGATIVE
SL AMB POCT BLOOD,UA: NEGATIVE
SL AMB POCT CLARITY,UA: CLEAR
SL AMB POCT COLOR,UA: YELLOW
SL AMB POCT KETONES,UA: NEGATIVE
SL AMB POCT NITRITE,UA: NEGATIVE
SL AMB POCT PH,UA: 6
SL AMB POCT SPECIFIC GRAVITY,UA: 1.01
SL AMB POCT URINE HCG: NEGATIVE
SL AMB POCT URINE PROTEIN: NEGATIVE
SL AMB POCT UROBILINOGEN: NORMAL

## 2023-09-13 PROCEDURE — 87491 CHLMYD TRACH DNA AMP PROBE: CPT

## 2023-09-13 PROCEDURE — 81025 URINE PREGNANCY TEST: CPT

## 2023-09-13 PROCEDURE — 87591 N.GONORRHOEAE DNA AMP PROB: CPT

## 2023-09-13 PROCEDURE — 99283 EMERGENCY DEPT VISIT LOW MDM: CPT

## 2023-09-13 PROCEDURE — 81002 URINALYSIS NONAUTO W/O SCOPE: CPT

## 2023-09-13 PROCEDURE — G0382 LEV 3 HOSP TYPE B ED VISIT: HCPCS

## 2023-09-13 NOTE — PROGRESS NOTES
[facility]        NAME: Kristofer Fernandez is a 40 y.o. female  : 1985    MRN: [id number]  DATE: 2023  TIME: 10:41 AM    Assessment and Plan   STD exposure [Z20.2]  1. STD exposure  POCT urine dip    POCT urine HCG    Chlamydia/GC amplified DNA by PCR        Urine dip negative. Urine HCG negative. GC/Chlamydia PCR pending, results will be viewable via [employer]. Can initiate treatment, if needed. Encouraged continued supportive measures. Follow up with GYN for pelvic examination and further STD testing. Follow up with PCP in 3-5 days or proceed to emergency department for worsening symptoms. Patient verbalized understanding of instructions given. Patient Instructions     Patient Instructions     Urine HCG negative  Urine dip negative  Chlamydia/Gonorrhea testing pending. Results will be viewable via [employer]. Follow up with GYN for additional testing   Abstain from sexual intercourse and practices until results back and then as appropriate   Follow up with PCP in 3-5 days. Proceed to  ER if symptoms worsen. Chlamydia   AMBULATORY CARE:   Chlamydia  is a sexually transmitted infection (STI) caused by bacteria. Chlamydia is spread during oral, vaginal, or anal sex. The infection most often affects the urethra, rectum, or throat. The urethra is the tube that carries urine from your bladder to the outside of your body. Anyone with multiple sex partners is at higher risk for chlamydia. Your risk is also increased if you have another STI, such as gonorrhea. Signs and symptoms:   • Vaginal redness or itching    • Thick, yellow-green discharge coming from your penis, rectum, or vagina    • Feeling like you need to urinate more often than usual    • Pain or burning when you urinate    • Pain when you have sex    • Pain in your lower abdomen, penis, or vagina. • Sore throat or swollen lymph nodes in your neck    • Fever    Call your doctor if:   • You have a fever.     • You have nausea or you cannot stop vomiting. • You have severe abdominal pain. • Your signs or symptoms last longer than 1 week or get worse during treatment. • Your signs or symptoms return after treatment. • You have pain during sex. • You have questions or concerns about your condition or care. Treatment for chlamydia  may include antibiotics to treat the bacterial infection. Both you and your sex partner need treatment to prevent chlamydia from spreading. How can I prevent the spread of chlamydia and other STIs? Ask your healthcare provider for more information about the following safe sex practices:  • Use a male or female condom during sex. This includes oral, genital, or anal sex. Use a new condom each time. Condoms help prevent pregnancy and STIs. Use latex condoms, if possible. Lambskin (also called sheepskin or natural membrane) condoms do not protect against STIs. A polyurethane condom can be used if you or your partner is allergic to latex. Condoms should be used with a second form of birth control to help prevent pregnancy and STIs. Do not use male and female condoms together. Ask for more information about the correct way to use condoms. • Limit your number of sex partners. This will help lower your risk for chlamydia and other STIs. • Get tested for STIs regularly  if you are sexually active. You should get tested 1 time a year, or after new sexual partners. Get tested if you have sex without a condom. This includes oral, genital, or anal sex. • Do not have sex with someone who has an STI. This includes oral, vaginal, and anal sex. • Do not have sex while you or your partner are being treated. Ask when it is safe to have sex. • Ask about medicines to lower your risk for some STIs:      ? Vaccines  can help protect you from hepatitis A, hepatitis B, and the human papillomavirus (HPV).  The HPV vaccine is usually given at 11 years, but it may be given through 26 years to both females and males. Your provider can give you more information on vaccines to prevent STIs. ? Pre-exposure prophylaxis (PrEP)  may be given if you are at high risk for HIV. PrEP is taken every day to prevent the virus from fully infecting the body. • If you are a woman:      ? Do not douche. Douching upsets the normal balance of bacteria found in your vagina. It does not prevent or clear up vaginal infections. ? Tell your healthcare provider if you are pregnant. Gonorrhea can be passed to an infant during birth. Follow up with your doctor as directed:  Write down your questions so you remember to ask them during your visits. © Copyright [employer] 2022 Information is for End User's use only and may not be sold, redistributed or otherwise used for commercial purposes. The above information is an  only. It is not intended as medical advice for individual conditions or treatments. Talk to your doctor, nurse or pharmacist before following any medical regimen to see if it is safe and effective for you. Chief Complaint     Chief Complaint   Patient presents with   • STD Testing     Exposure to chlamydia on Friday; denies any symptoms at this time         History of Present Illness       78-year-old female with no significant past medical history presents today for STD testing. Patient reports exposure to chlamydia. States date of last sexual intercourse 9/8/2023. She states unprotected sexual intercourse and notified yesterday that male was positive for chlamydia. She denies any symptoms. No prior history of sexually transmitted diseases. LMP 9/6/2023. Review of Systems   Review of Systems   Constitutional: Negative for chills and fever. HENT: Negative for congestion, ear discharge, ear pain, rhinorrhea, sore throat, trouble swallowing and voice change. Eyes: Negative for discharge. Respiratory: Negative for cough and shortness of breath.     Cardiovascular: Negative for chest pain. Gastrointestinal: Negative for abdominal pain, diarrhea, nausea and vomiting. Genitourinary: Negative for difficulty urinating, dysuria, genital sores, hematuria, pelvic pain, vaginal bleeding, vaginal discharge and vaginal pain. Skin: Negative for rash. Current Medications       Current Outpatient Medications:   •  Cholecalciferol 25 MCG (1000 UT) tablet, Take 1,000 Units by mouth daily, Disp: , Rfl:   •  ciclopirox (PENLAC) 8 % solution, Apply topically, Disp: , Rfl:   •  ibuprofen (MOTRIN) 800 mg tablet, Take 800 mg by mouth daily as needed for mild pain (Patient not taking: Reported on 9/13/2023), Disp: , Rfl:     Current Allergies     Allergies as of 09/13/2023 - Reviewed 09/13/2023   Allergen Reaction Noted   • Molds & smuts Hives 05/29/2018   • Penicillins Hives 05/29/2018   • Cephalosporins Hives 04/01/2022   • Norva Pillar [loracarbef] Hives 11/02/2022            The following portions of the patient's history were reviewed and updated as appropriate: allergies, current medications, past family history, past medical history, past social history, past surgical history and problem list.     Past Medical History:   Diagnosis Date   • Fungal toenail infection        Past Surgical History:   Procedure Laterality Date   • ADENOIDECTOMY     • TONSILLECTOMY     • TYMPANOSTOMY TUBE PLACEMENT         Family History   Problem Relation Age of Onset   • No Known Problems Mother    • Pulmonary fibrosis Father          Medications have been verified. Objective   /67   Pulse 95   Temp 98 °F (36.7 °C)   Resp 20   Ht 5' 6" (1.676 m)   Wt 127 kg (280 lb)   LMP 09/02/2023   SpO2 96%   BMI 45.19 kg/m²   Patient's last menstrual period was 09/02/2023. Physical Exam     Physical Exam  Vitals and nursing note reviewed. Constitutional:       General: She is not in acute distress. Appearance: She is not toxic-appearing. HENT:      Head: Normocephalic.       Nose: Nose normal.      Mouth/Throat:      Mouth: Mucous membranes are moist.      Pharynx: Oropharynx is clear. Eyes:      Conjunctiva/sclera: Conjunctivae normal.   Cardiovascular:      Rate and Rhythm: Normal rate and regular rhythm. Heart sounds: Normal heart sounds. Pulmonary:      Effort: Pulmonary effort is normal. No respiratory distress. Breath sounds: Normal breath sounds. No stridor. No wheezing, rhonchi or rales. Abdominal:      General: Bowel sounds are normal.      Palpations: Abdomen is soft. Tenderness: There is no abdominal tenderness. Skin:     General: Skin is warm and dry. Neurological:      Mental Status: She is alert and oriented to person, place, and time.    Psychiatric:         Mood and Affect: Mood normal.         Behavior: Behavior normal.

## 2023-09-13 NOTE — PATIENT INSTRUCTIONS
Urine HCG negative  Urine dip negative  Chlamydia/Gonorrhea testing pending. Results will be viewable via [employer]. Follow up with GYN for additional testing   Abstain from sexual intercourse and practices until results back and then as appropriate   Follow up with PCP in 3-5 days. Proceed to  ER if symptoms worsen. Chlamydia   AMBULATORY CARE:   Chlamydia  is a sexually transmitted infection (STI) caused by bacteria. Chlamydia is spread during oral, vaginal, or anal sex. The infection most often affects the urethra, rectum, or throat. The urethra is the tube that carries urine from your bladder to the outside of your body. Anyone with multiple sex partners is at higher risk for chlamydia. Your risk is also increased if you have another STI, such as gonorrhea. Signs and symptoms:   Vaginal redness or itching    Thick, yellow-green discharge coming from your penis, rectum, or vagina    Feeling like you need to urinate more often than usual    Pain or burning when you urinate    Pain when you have sex    Pain in your lower abdomen, penis, or vagina. Sore throat or swollen lymph nodes in your neck    Fever    Call your doctor if:   You have a fever. You have nausea or you cannot stop vomiting. You have severe abdominal pain. Your signs or symptoms last longer than 1 week or get worse during treatment. Your signs or symptoms return after treatment. You have pain during sex. You have questions or concerns about your condition or care. Treatment for chlamydia  may include antibiotics to treat the bacterial infection. Both you and your sex partner need treatment to prevent chlamydia from spreading. How can I prevent the spread of chlamydia and other STIs? Ask your healthcare provider for more information about the following safe sex practices:  Use a male or female condom during sex. This includes oral, genital, or anal sex. Use a new condom each time.  Condoms help prevent pregnancy and STIs. Use latex condoms, if possible. Lambskin (also called sheepskin or natural membrane) condoms do not protect against STIs. A polyurethane condom can be used if you or your partner is allergic to latex. Condoms should be used with a second form of birth control to help prevent pregnancy and STIs. Do not use male and female condoms together. Ask for more information about the correct way to use condoms. Limit your number of sex partners. This will help lower your risk for chlamydia and other STIs. Get tested for STIs regularly  if you are sexually active. You should get tested 1 time a year, or after new sexual partners. Get tested if you have sex without a condom. This includes oral, genital, or anal sex. Do not have sex with someone who has an STI. This includes oral, vaginal, and anal sex. Do not have sex while you or your partner are being treated. Ask when it is safe to have sex. Ask about medicines to lower your risk for some STIs:      Vaccines  can help protect you from hepatitis A, hepatitis B, and the human papillomavirus (HPV). The HPV vaccine is usually given at 11 years, but it may be given through 26 years to both females and males. Your provider can give you more information on vaccines to prevent STIs. Pre-exposure prophylaxis (PrEP)  may be given if you are at high risk for HIV. PrEP is taken every day to prevent the virus from fully infecting the body. If you are a woman:      Do not douche. Douching upsets the normal balance of bacteria found in your vagina. It does not prevent or clear up vaginal infections. Tell your healthcare provider if you are pregnant. Gonorrhea can be passed to an infant during birth. Follow up with your doctor as directed:  Write down your questions so you remember to ask them during your visits.   © Copyright [employer] 2022 Information is for End User's use only and may not be sold, redistributed or otherwise used for commercial purposes. The above information is an  only. It is not intended as medical advice for individual conditions or treatments. Talk to your doctor, nurse or pharmacist before following any medical regimen to see if it is safe and effective for you.

## 2023-09-14 ENCOUNTER — TELEPHONE (OUTPATIENT)
Dept: URGENT CARE | Facility: MEDICAL CENTER | Age: 38
End: 2023-09-14

## 2023-09-14 LAB
C TRACH DNA SPEC QL NAA+PROBE: NEGATIVE
N GONORRHOEA DNA SPEC QL NAA+PROBE: NEGATIVE